# Patient Record
Sex: MALE | Race: WHITE
[De-identification: names, ages, dates, MRNs, and addresses within clinical notes are randomized per-mention and may not be internally consistent; named-entity substitution may affect disease eponyms.]

---

## 2018-11-19 ENCOUNTER — HEALTH MAINTENANCE LETTER (OUTPATIENT)
Age: 23
End: 2018-11-19

## 2018-11-19 ENCOUNTER — OFFICE VISIT (OUTPATIENT)
Dept: FAMILY MEDICINE | Facility: CLINIC | Age: 23
End: 2018-11-19
Payer: COMMERCIAL

## 2018-11-19 VITALS
DIASTOLIC BLOOD PRESSURE: 75 MMHG | SYSTOLIC BLOOD PRESSURE: 136 MMHG | OXYGEN SATURATION: 97 % | RESPIRATION RATE: 16 BRPM | HEIGHT: 68 IN | TEMPERATURE: 98 F | HEART RATE: 88 BPM | BODY MASS INDEX: 23.52 KG/M2 | WEIGHT: 155.2 LBS

## 2018-11-19 DIAGNOSIS — F32.A DEPRESSION, UNSPECIFIED DEPRESSION TYPE: ICD-10-CM

## 2018-11-19 DIAGNOSIS — Z63.9 RELATIONSHIP PROBLEMS: ICD-10-CM

## 2018-11-19 DIAGNOSIS — F41.9 ANXIETY: Primary | ICD-10-CM

## 2018-11-19 RX ORDER — SERTRALINE HYDROCHLORIDE 100 MG/1
100 TABLET, FILM COATED ORAL DAILY
Qty: 90 TABLET | Refills: 1 | Status: SHIPPED | OUTPATIENT
Start: 2018-11-19 | End: 2019-02-03 | Stop reason: SINTOL

## 2018-11-19 SDOH — SOCIAL STABILITY - SOCIAL INSECURITY: PROBLEM RELATED TO PRIMARY SUPPORT GROUP, UNSPECIFIED: Z63.9

## 2018-11-19 ASSESSMENT — ANXIETY QUESTIONNAIRES
IF YOU CHECKED OFF ANY PROBLEMS ON THIS QUESTIONNAIRE, HOW DIFFICULT HAVE THESE PROBLEMS MADE IT FOR YOU TO DO YOUR WORK, TAKE CARE OF THINGS AT HOME, OR GET ALONG WITH OTHER PEOPLE: VERY DIFFICULT
1. FEELING NERVOUS, ANXIOUS, OR ON EDGE: NEARLY EVERY DAY
2. NOT BEING ABLE TO STOP OR CONTROL WORRYING: NEARLY EVERY DAY
GAD7 TOTAL SCORE: 19
3. WORRYING TOO MUCH ABOUT DIFFERENT THINGS: NEARLY EVERY DAY
6. BECOMING EASILY ANNOYED OR IRRITABLE: NEARLY EVERY DAY
7. FEELING AFRAID AS IF SOMETHING AWFUL MIGHT HAPPEN: NEARLY EVERY DAY
5. BEING SO RESTLESS THAT IT IS HARD TO SIT STILL: SEVERAL DAYS

## 2018-11-19 ASSESSMENT — PATIENT HEALTH QUESTIONNAIRE - PHQ9: 5. POOR APPETITE OR OVEREATING: NEARLY EVERY DAY

## 2018-11-19 NOTE — LETTER
November 19, 2018      Jonathan Billings  [address] [address]      To Whom It May Concern  I saw Jonathan Billings today for behavioral health issues. I would recommend a medical leave for him to seek therapy and to preventive problems on campus. I am setting him up with a therapist at [facility].      Sincerely,        Silviano Sebastian MD

## 2018-11-19 NOTE — PATIENT INSTRUCTIONS
Follow up with Dr. Davalos on Wednesday 11/28/2018 at 1:20pm.      Crisis Numbers     [place] - [phone] ([facility] Response Team)    [facility] [phone] (Emergency Psychiatric Evaluation)     [facility] Multilingual Crisis Line:  [phone]    Acute Psychiatric Services - [facility]  24 hour crisis walk-in and crisis line  [address], [place]  [phone]    Crisis Text Line: Text [place] to [phone]. Free support at your fingertips 24/7  People who text [place] to [phone] will be connected with a counselor. Crisis Text Line is available 24 hours a day, seven days a week.    Or call 647

## 2018-11-19 NOTE — MR AVS SNAPSHOT
After Visit Summary   11/19/2018    Jonathan Billings    MRN: [id number]           Patient Information     Date Of Birth          1995        Visit Information        Provider Department      11/19/2018 11:00 AM Silviano Wills MD [facility]        Today's Diagnoses     Anxiety    -  1    Depression, unspecified depression type        Relationship problems          Care Instructions    Follow up with Dr. Davalos on Wednesday 11/28/2018 at 1:20pm.      Crisis Numbers     [place] - [phone] ([facility] Mobile Response Team)    [facility] [phone] (Emergency Psychiatric Evaluation)     [facility] Multilingual Crisis Line:  [phone]    Acute Psychiatric Services - [facility]  24 hour crisis walk-in and crisis line  [address] [place], [place]  [phone]    Crisis Text Line: Text [place] to 412962. Free support at your fingertips 24/7  People who text [place] to [phone] will be connected with a counselor. Crisis Text Line is available 24 hours a day, seven days a week.    Or call 911           Follow-ups after your visit        Additional Services     BEHAVIORAL HEALTH REFERRAL ([facility] interal and external)       Referring MD: SILVIANO WILLS    May leave message on voicemail: Yes  Doesn't have a phone at this time  Parents number - [phone] (ok to simply leave a message about appointment with any personal information)    PHQ-9 Score: 14  GAD7 Score: 16+                  Who to contact     Please call your clinic at [phone] to:    Ask questions about your health    Make or cancel appointments    Discuss your medicines    Learn about your test results    Speak to your doctor            Additional Information About Your Visit        Care EveryWhere ID     This is your Care EveryWhere ID. This could be used by other organizations to access your [facility] medical records  [id number]        Your Vitals Were     Pulse Temperature Respirations Height Pulse  "Oximetry BMI (Body Mass Index)    88 98  F (36.7  C) (Oral) 16 5' 8\" (172.7 cm) 97% 23.6 kg/m2       Blood Pressure from Last 3 Encounters:   11/19/18 136/75   08/29/11 112/62   02/13/11 113/73    Weight from Last 3 Encounters:   11/19/18 155 lb 3.2 oz (70.4 kg)   08/29/11 143 lb (64.9 kg) (81 %)*     * Growth percentiles are based on [facility] 2-20 Years data.              We Performed the Following     BEHAVIORAL HEALTH REFERRAL ([facility] interal and external)          Today's Medication Changes          These changes are accurate as of 11/19/18 12:22 PM.  If you have any questions, ask your nurse or doctor.               Start taking these medicines.        Dose/Directions    sertraline 100 MG tablet   Commonly known as:  ZOLOFT   Used for:  Anxiety   Started by:  Silviano Sebastian MD        Dose:  100 mg   Take 1 tablet (100 mg) by mouth daily   Quantity:  90 tablet   Refills:  1            Where to get your medicines      Some of these will need a paper prescription and others can be bought over the counter.  Ask your nurse if you have questions.     Bring a paper prescription for each of these medications     sertraline 100 MG tablet                Primary Care Provider Office Phone # Fax #    Silviano Sebastian [phone]       [address] [address]        Equal Access to Services     GRICELDA DIAZ AH: Hadjuan jose rossi Sojade, waaxda luqadaha, qaybta kaalmada yenny, maria d whitehead . So [facility] [phone].    ATENCIÓN: Si habla español, tiene a reece disposición servicios gratuitos de asistencia lingüística. Stefanie al [phone].    We comply with applicable federal civil rights laws and [place] laws. We do not discriminate on the basis of race, color, national origin, age, disability, sex, sexual orientation, or gender identity.            Thank you!     Thank you for choosing hospitals [facility]  for your care. Our goal is always " to provide you with excellent care. Hearing back from our patients is one way we can continue to improve our services. Please take a few minutes to complete the written survey that you may receive in the mail after your visit with us. Thank you!             Your Updated Medication List - Protect others around you: Learn how to safely use, store and throw away your medicines at www.disposemymeds.org.          This list is accurate as of 11/19/18 12:22 PM.  Always use your most recent med list.                   Brand Name Dispense Instructions for use Diagnosis    sertraline 100 MG tablet    ZOLOFT    90 tablet    Take 1 tablet (100 mg) by mouth daily    Anxiety
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
## 2018-11-20 ENCOUNTER — TELEPHONE (OUTPATIENT)
Dept: FAMILY MEDICINE | Facility: CLINIC | Age: 23
End: 2018-11-20

## 2018-11-20 NOTE — TELEPHONE ENCOUNTER
Mental Health Referral:     Patient was scheduled with Dr. Davaols on 11/28/2018 at 1:20pm for a mental health diagnostic assessment.     Thank you!    Roxann Velásquez, Ph.D.  Behavioral Health Fellow

## 2018-11-28 ASSESSMENT — PATIENT HEALTH QUESTIONNAIRE - PHQ9: SUM OF ALL RESPONSES TO PHQ QUESTIONS 1-9: 14

## 2018-11-29 ASSESSMENT — ANXIETY QUESTIONNAIRES: GAD7 TOTAL SCORE: 19

## 2018-12-20 ENCOUNTER — TELEPHONE (OUTPATIENT)
Dept: PSYCHOLOGY | Facility: CLINIC | Age: 23
End: 2018-12-20

## 2019-01-29 ENCOUNTER — OFFICE VISIT (OUTPATIENT)
Dept: PSYCHOLOGY | Facility: CLINIC | Age: 24
End: 2019-01-29
Payer: COMMERCIAL

## 2019-01-29 DIAGNOSIS — F33.1 MAJOR DEPRESSIVE DISORDER, RECURRENT EPISODE, MODERATE (H): Primary | ICD-10-CM

## 2019-01-29 DIAGNOSIS — F41.9 ANXIETY DISORDER, UNSPECIFIED TYPE: ICD-10-CM
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
## 2019-01-29 NOTE — Clinical Note
Hi,Sorry my note was done by the time you saw Jonathan again.  He is following up with a trauma specific a

## 2019-02-01 ENCOUNTER — OFFICE VISIT (OUTPATIENT)
Dept: FAMILY MEDICINE | Facility: CLINIC | Age: 24
End: 2019-02-01
Payer: COMMERCIAL

## 2019-02-01 VITALS
SYSTOLIC BLOOD PRESSURE: 138 MMHG | BODY MASS INDEX: 25.51 KG/M2 | TEMPERATURE: 98 F | WEIGHT: 167.8 LBS | HEART RATE: 83 BPM | DIASTOLIC BLOOD PRESSURE: 76 MMHG | RESPIRATION RATE: 16 BRPM | OXYGEN SATURATION: 98 %

## 2019-02-01 DIAGNOSIS — F41.9 ANXIETY: Primary | ICD-10-CM

## 2019-02-01 DIAGNOSIS — F17.200 SMOKER: ICD-10-CM

## 2019-02-01 RX ORDER — ESCITALOPRAM OXALATE 10 MG/1
10 TABLET ORAL DAILY
Qty: 30 TABLET | Refills: 1 | Status: SHIPPED | OUTPATIENT
Start: 2019-02-01 | End: 2019-04-02

## 2019-02-01 ASSESSMENT — ANXIETY QUESTIONNAIRES
5. BEING SO RESTLESS THAT IT IS HARD TO SIT STILL: SEVERAL DAYS
IF YOU CHECKED OFF ANY PROBLEMS ON THIS QUESTIONNAIRE, HOW DIFFICULT HAVE THESE PROBLEMS MADE IT FOR YOU TO DO YOUR WORK, TAKE CARE OF THINGS AT HOME, OR GET ALONG WITH OTHER PEOPLE: SOMEWHAT DIFFICULT
6. BECOMING EASILY ANNOYED OR IRRITABLE: MORE THAN HALF THE DAYS
3. WORRYING TOO MUCH ABOUT DIFFERENT THINGS: NEARLY EVERY DAY
GAD7 TOTAL SCORE: 14
7. FEELING AFRAID AS IF SOMETHING AWFUL MIGHT HAPPEN: MORE THAN HALF THE DAYS
2. NOT BEING ABLE TO STOP OR CONTROL WORRYING: MORE THAN HALF THE DAYS
1. FEELING NERVOUS, ANXIOUS, OR ON EDGE: MORE THAN HALF THE DAYS

## 2019-02-01 ASSESSMENT — PATIENT HEALTH QUESTIONNAIRE - PHQ9
5. POOR APPETITE OR OVEREATING: MORE THAN HALF THE DAYS
SUM OF ALL RESPONSES TO PHQ QUESTIONS 1-9: 16

## 2019-02-01 NOTE — PATIENT INSTRUCTIONS
1. Anxiety  Switch to lexapro - 1 pill daily  Find the trauma therapists  See me in one month. Consider increasing lexapro      - escitalopram (LEXAPRO) 10 MG tablet; Take 1 tablet (10 mg) by mouth daily  Dispense: 30 tablet; Refill: 1

## 2019-02-01 NOTE — PROGRESS NOTES
HPI  23 year old male here for evaluation of depression/anxiety    Patient has been on Zoloft 100 mg daily for anxiety/pression.  He was having prolonged ejaculation which was fine.  However then he developed impotence.  He stopped the medication and his impotence resolved.  He does not want to go back to Zoloft.  He would like to change medications.    Anxiety is a major issue depression is more minor aspect.  Nonetheless he is PHQ9=16.  Q9 = 2.  Long discussion about suicidal thoughts.  These are usually fleeting.  He does not have the medications to overdose.    He had his first therapy session.  He is being referred to a trauma therapist for continuing therapy.    He was going to [employer].  Plans to return.  He needs to have his girlfriend complete before he is allowed to come back.  He was also told that once he gets clearance it will be 2 months before he can return    Relationship with his girlfriend is complicated.  This was part of the reason that he was  from [employer].  They were actually living together and now have     Denies drug use including weed  Is using etoh on occassion        ROS  6 point ROS neg other than the symptoms noted above in the HPI.    MEDS  Current Outpatient Medications   Medication     sertraline (ZOLOFT) 100 MG tablet     No current facility-administered medications for this visit.          SOC  smoker    FHx  Depression/anxiety in family      PHYSICAL EXAMINATION:  Vital signs: /76   Pulse 83   Temp 98  F (36.7  C) (Oral)   Resp 16   Wt 76.1 kg (167 lb 12.8 oz)   SpO2 98%   BMI 25.51 kg/m      Gen: no distress  MSE: some SI without a doable plan      LABS        ASSESSMENT/PLAN    1. Anxiety  Discontinue Zoloft and begin Lexapro 10 mg daily.  We will likely need to go up to 20 mg of Lexapro patient to return in 1 month to review side effects and any positive effects.    He will need to get a letter of clearance before returning to [employer].  His  relationship with his girlfriend is complicated he .    - escitalopram (LEXAPRO) 10 MG tablet; Take 1 tablet (10 mg) by mouth daily  Dispense: 30 tablet; Refill: 1      ARMANDO VALDEZ    25min spent with the patient, >50% in arranging care for anxiety and school completion  Armando

## 2019-02-02 ASSESSMENT — ANXIETY QUESTIONNAIRES: GAD7 TOTAL SCORE: 14

## 2019-02-03 PROBLEM — F17.200 SMOKER: Status: ACTIVE | Noted: 2019-02-03

## 2019-02-07 ASSESSMENT — ANXIETY QUESTIONNAIRES
3. WORRYING TOO MUCH ABOUT DIFFERENT THINGS: NEARLY EVERY DAY
6. BECOMING EASILY ANNOYED OR IRRITABLE: NEARLY EVERY DAY
IF YOU CHECKED OFF ANY PROBLEMS ON THIS QUESTIONNAIRE, HOW DIFFICULT HAVE THESE PROBLEMS MADE IT FOR YOU TO DO YOUR WORK, TAKE CARE OF THINGS AT HOME, OR GET ALONG WITH OTHER PEOPLE: VERY DIFFICULT
GAD7 TOTAL SCORE: 19
1. FEELING NERVOUS, ANXIOUS, OR ON EDGE: NEARLY EVERY DAY
2. NOT BEING ABLE TO STOP OR CONTROL WORRYING: NEARLY EVERY DAY
7. FEELING AFRAID AS IF SOMETHING AWFUL MIGHT HAPPEN: SEVERAL DAYS
5. BEING SO RESTLESS THAT IT IS HARD TO SIT STILL: NEARLY EVERY DAY

## 2019-02-07 ASSESSMENT — PATIENT HEALTH QUESTIONNAIRE - PHQ9
SUM OF ALL RESPONSES TO PHQ QUESTIONS 1-9: 20
5. POOR APPETITE OR OVEREATING: NEARLY EVERY DAY

## 2019-02-07 NOTE — PROGRESS NOTES
"Behavioral Health Progress Note    Legal Name: Jonathan Billings   Preferred Name: Jonathan   Service Type:Individual  Length of Visit: 60 minutes  Attendees: Patient     Identifying Information and Presenting Problem:    Jonathan is a 23 year old American person who is being seen for problematic symptoms of mood and trauma symptoms.  Patient is coming in for therapy as he reports that he needs a note to be able to return back to [facility].  He is has had some therapy in the past but is unsure that it was helpful to  Him.    Spent today discussing his current mental health concerns and identifying what level of support would be most helpful to Jonathan at this time.    Patient was in [facility] until his girlfriend started having mental health issues.  He states that he exaggerated his symptoms so that he could also be allowed to have time off with her during her hospital stay.  He states that he has a history of putting other's needs before his own and feels that this may have \"screwed him\" in the short term.  He needs a note stating his mental health is stable in order for him to return.  Once he has a note, he is able to return 2 months later.  Discussed his current mood and anxiety.  They are not well controlled but he reports that he knows he has a plan for moving forward in his life.  He has had passive suicide ideation for most of his teenage/adult life at this point.  He reports that he is not a outwardly violent person and never has been.  He does not feel at risk to anyone.     He reports that he moved around a lot as a kid.  He has a twin sister and a brother that is 7 years older.  They are not close.  He tries to maintain contact with his mother and describes her as having addiction issues.      Jonathan is living with a friend and working on getting work from [employer].  He is also still seeing his girlfriend and states he knows this is a complicated and codependent relationship but he is not willing to change that " part of his life.      Treatment Objective(s) Addressed in This Session:  Depressed Mood: Decrease frequency and intensity of feeling down, depressed, hopeless  Feel less tired and more energy during the day   Improve concentration, focus, and mindfulness in daily activities   Anxiety: will develop more effective coping skills to manage anxiety symptoms, will develop healthy cognitive patterns and beliefs and will increase ability to function adaptively      Progress on / Status of Treatment Objective(s) / Homework:  New Objective established this session       PHQ-9 SCORE 11/19/2018 2/1/2019 2/7/2019   PHQ-9 Total Score 14 16 20       DONOVAN-7 SCORE 11/19/2018 2/1/2019 2/7/2019   Total Score 19 14 19       Topics Discussed/Interventions Provided:  Discussed how the medication was going.  He states that it caused sexual side effects so he stopped taking the medication. Referred him back to Dr. Sebastian to further discuss.    Patient has significant trauma history and is struggling manage his daily living.  Discussed addressing underlying mood and anxiety symptoms could be a result of the trauma.  Explored trauma specific treatment in session and Jonathan is willing to explore.    CAGE-AID:    Have you ever felt:    You needed to cut down on your drug or alcohol use Yes  You were annoyed when others asked you about your drug or alcohol use Yes  You felt guilty about your drug or alcohol use Yes  You needed an eye opener first thing in the morning Yes    Primary Care PTSD Screen  In your life, have you ever had any experience that was so frightening, horrible or upsetting that, in the past month, you...    1. Have had nightmares about it or thought about it when you did not want to? Yes  2. Tried hard not to think about it or went out of your way to avoid situations that remind you of it? Yes  3. Were constantly on guard, watchful, or easily startled? Yes  4. Felt numb or detached from others, activities, or your  "surroundings? Yes    Current research suggests that the results of the PC-PTSD should be considered \"positive\" if a patient answers \"yes\" to any (3) items.    References    RODRIGUEZ Richardson, YASHIRA Michaels, Kimerling, R., ROB Edwards, MISAEL Drew, VERA Lind, RODRIGUEZ Montiel, NATALIE Berg., Vargas, J.I. (2004). The primary care PTSD screen (PC-PTSD): development and operating characteristics. Primary Care Psychiatry, 9, 9-14.    Assessment: The patient appeared to be active and engaged in today's session and was receptive to feedback.     Mental Status: Patient appeared generally alert and oriented. Dress was casual and appropriate to the weather and occasion. Grooming and hygiene were fair. Eye contact was fair. Speech was of normal volume and rate and was clear, coherent, and relevant. Mood was euthymic with congruent affect. Thought processes were relevant, logical and goal-directed. Thought content was WNL with no evidence of psychotic or paranoid features. Denies any SI/HI or self-harm, intent, or plans. Memory appeared grossly intact. Insight and judgment appeared fair and patient exhibited good impulse control during the appointment.     Does the patient appear to be at imminent risk of harm to self/others at this time? No    The session was necessary to address the symptoms that have been interfering with patient's ability to function in all areas of their life.  Ongoing psychotherapy is necessary to provide counseling and support.      DSM-V Diagnosis:   296.32 (F33.1) Major Depressive Disorder, Recurrent Episode, Moderate _  300.00 (F41.9) Unspecified Anxiety Disorder    Rule out:309.81 (F43.10) Posttraumatic Stress Disorder (includes Posttraumatic Stress Disorder for Children 6 Years and Younger)     Plan:    Follow up with trauma therapist (referrals given today)    Follow up with me as needed for support until established with new therapist.    Utilize crisis resources as needed.      NOTE: Treatment " plan update and Diagnostic assessment due

## 2019-02-08 ASSESSMENT — ANXIETY QUESTIONNAIRES: GAD7 TOTAL SCORE: 19

## 2019-04-02 ENCOUNTER — OFFICE VISIT (OUTPATIENT)
Dept: FAMILY MEDICINE | Facility: CLINIC | Age: 24
End: 2019-04-02
Payer: COMMERCIAL

## 2019-04-02 VITALS
BODY MASS INDEX: 24.51 KG/M2 | OXYGEN SATURATION: 99 % | DIASTOLIC BLOOD PRESSURE: 65 MMHG | HEART RATE: 85 BPM | TEMPERATURE: 98.1 F | SYSTOLIC BLOOD PRESSURE: 126 MMHG | RESPIRATION RATE: 16 BRPM | WEIGHT: 161.2 LBS

## 2019-04-02 DIAGNOSIS — F32.A DEPRESSION, UNSPECIFIED DEPRESSION TYPE: Primary | ICD-10-CM

## 2019-04-02 DIAGNOSIS — Z00.00 HEALTHCARE MAINTENANCE: ICD-10-CM

## 2019-04-02 ASSESSMENT — ANXIETY QUESTIONNAIRES
5. BEING SO RESTLESS THAT IT IS HARD TO SIT STILL: SEVERAL DAYS
6. BECOMING EASILY ANNOYED OR IRRITABLE: SEVERAL DAYS
7. FEELING AFRAID AS IF SOMETHING AWFUL MIGHT HAPPEN: SEVERAL DAYS
2. NOT BEING ABLE TO STOP OR CONTROL WORRYING: SEVERAL DAYS
1. FEELING NERVOUS, ANXIOUS, OR ON EDGE: SEVERAL DAYS
3. WORRYING TOO MUCH ABOUT DIFFERENT THINGS: SEVERAL DAYS
IF YOU CHECKED OFF ANY PROBLEMS ON THIS QUESTIONNAIRE, HOW DIFFICULT HAVE THESE PROBLEMS MADE IT FOR YOU TO DO YOUR WORK, TAKE CARE OF THINGS AT HOME, OR GET ALONG WITH OTHER PEOPLE: NOT DIFFICULT AT ALL
GAD7 TOTAL SCORE: 7

## 2019-04-02 ASSESSMENT — PATIENT HEALTH QUESTIONNAIRE - PHQ9
5. POOR APPETITE OR OVEREATING: SEVERAL DAYS
SUM OF ALL RESPONSES TO PHQ QUESTIONS 1-9: 9

## 2019-04-02 NOTE — PATIENT INSTRUCTIONS
Jonathan was here at [facility] today  He will call [employer] to start discussion about his return from [employer].    Armando VALDEZ
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
## 2019-04-03 ASSESSMENT — ANXIETY QUESTIONNAIRES: GAD7 TOTAL SCORE: 7
